# Patient Record
Sex: MALE | URBAN - METROPOLITAN AREA
[De-identification: names, ages, dates, MRNs, and addresses within clinical notes are randomized per-mention and may not be internally consistent; named-entity substitution may affect disease eponyms.]

---

## 2022-06-08 ENCOUNTER — APPOINTMENT (RX ONLY)
Dept: URBAN - METROPOLITAN AREA CLINIC 166 | Facility: CLINIC | Age: 62
Setting detail: DERMATOLOGY
End: 2022-06-08

## 2022-06-08 DIAGNOSIS — L56.8 OTHER SPECIFIED ACUTE SKIN CHANGES DUE TO ULTRAVIOLET RADIATION: ICD-10-CM | Status: WORSENING

## 2022-06-08 DIAGNOSIS — D22 MELANOCYTIC NEVI: ICD-10-CM

## 2022-06-08 DIAGNOSIS — L81.4 OTHER MELANIN HYPERPIGMENTATION: ICD-10-CM

## 2022-06-08 DIAGNOSIS — L82.1 OTHER SEBORRHEIC KERATOSIS: ICD-10-CM

## 2022-06-08 DIAGNOSIS — L57.0 ACTINIC KERATOSIS: ICD-10-CM

## 2022-06-08 DIAGNOSIS — L57.8 OTHER SKIN CHANGES DUE TO CHRONIC EXPOSURE TO NONIONIZING RADIATION: ICD-10-CM

## 2022-06-08 PROBLEM — D22.5 MELANOCYTIC NEVI OF TRUNK: Status: ACTIVE | Noted: 2022-06-08

## 2022-06-08 PROCEDURE — ? TREATMENT REGIMEN

## 2022-06-08 PROCEDURE — ? COUNSELING

## 2022-06-08 PROCEDURE — 17000 DESTRUCT PREMALG LESION: CPT

## 2022-06-08 PROCEDURE — 99204 OFFICE O/P NEW MOD 45 MIN: CPT | Mod: 25

## 2022-06-08 PROCEDURE — ? FULL BODY SKIN EXAM

## 2022-06-08 PROCEDURE — ? PRESCRIPTION

## 2022-06-08 PROCEDURE — ? LIQUID NITROGEN

## 2022-06-08 RX ORDER — PREDNISONE 20 MG/1
TABLET ORAL DAILY
Qty: 15 | Refills: 1 | Status: ERX | COMMUNITY
Start: 2022-06-08

## 2022-06-08 RX ORDER — BETAMETHASONE VALERATE 1 MG/G
OINTMENT TOPICAL
Qty: 45 | Refills: 2 | Status: ERX | COMMUNITY
Start: 2022-06-08

## 2022-06-08 RX ADMIN — BETAMETHASONE VALERATE: 1 OINTMENT TOPICAL at 00:00

## 2022-06-08 RX ADMIN — PREDNISONE: 20 TABLET ORAL at 00:00

## 2022-06-08 ASSESSMENT — LOCATION ZONE DERM
LOCATION ZONE: ARM
LOCATION ZONE: TRUNK
LOCATION ZONE: SCALP
LOCATION ZONE: FACE
LOCATION ZONE: NECK

## 2022-06-08 ASSESSMENT — LOCATION DETAILED DESCRIPTION DERM
LOCATION DETAILED: RIGHT PROXIMAL POSTERIOR UPPER ARM
LOCATION DETAILED: LEFT PROXIMAL ULNAR DORSAL FOREARM
LOCATION DETAILED: RIGHT PROXIMAL DORSAL FOREARM
LOCATION DETAILED: LEFT INFERIOR ANTERIOR NECK
LOCATION DETAILED: RIGHT CENTRAL FRONTAL SCALP
LOCATION DETAILED: LEFT INFERIOR CENTRAL MALAR CHEEK
LOCATION DETAILED: SUPERIOR THORACIC SPINE
LOCATION DETAILED: LEFT PROXIMAL POSTERIOR UPPER ARM
LOCATION DETAILED: RIGHT SUPERIOR MEDIAL UPPER BACK

## 2022-06-08 ASSESSMENT — LOCATION SIMPLE DESCRIPTION DERM
LOCATION SIMPLE: UPPER BACK
LOCATION SIMPLE: RIGHT UPPER BACK
LOCATION SIMPLE: RIGHT FOREARM
LOCATION SIMPLE: LEFT CHEEK
LOCATION SIMPLE: LEFT ANTERIOR NECK
LOCATION SIMPLE: LEFT FOREARM
LOCATION SIMPLE: RIGHT POSTERIOR UPPER ARM
LOCATION SIMPLE: RIGHT SCALP
LOCATION SIMPLE: LEFT POSTERIOR UPPER ARM

## 2022-06-08 ASSESSMENT — BSA RASH: BSA RASH: 14

## 2022-06-08 ASSESSMENT — PAIN INTENSITY VAS: HOW INTENSE IS YOUR PAIN 0 BEING NO PAIN, 10 BEING THE MOST SEVERE PAIN POSSIBLE?: NO PAIN

## 2022-06-08 ASSESSMENT — SEVERITY ASSESSMENT: SEVERITY: MILD TO MODERATE

## 2022-06-08 NOTE — PROCEDURE: LIQUID NITROGEN
Number Of Freeze-Thaw Cycles: 3 freeze-thaw cycles
Detail Level: Detailed
Show Aperture Variable?: Yes
Render Post-Care Instructions In Note?: no
Consent: The patient's consent was obtained including but not limited to risks of crusting, scabbing, blistering, scarring, darker or lighter pigmentary change, recurrence, incomplete removal and infection.
Post-Care Instructions: I reviewed with the patient in detail post-care instructions. Patient is to wear sunprotection, and avoid picking at any of the treated lesions. Pt may apply Vaseline to crusted or scabbing areas.
Duration Of Freeze Thaw-Cycle (Seconds): 3

## 2025-06-18 ENCOUNTER — APPOINTMENT (OUTPATIENT)
Dept: URBAN - METROPOLITAN AREA CLINIC 166 | Facility: CLINIC | Age: 65
Setting detail: DERMATOLOGY
End: 2025-06-18

## 2025-06-18 DIAGNOSIS — Z12.83 ENCOUNTER FOR SCREENING FOR MALIGNANT NEOPLASM OF SKIN: ICD-10-CM

## 2025-06-18 DIAGNOSIS — L82.1 OTHER SEBORRHEIC KERATOSIS: ICD-10-CM

## 2025-06-18 DIAGNOSIS — L57.0 ACTINIC KERATOSIS: ICD-10-CM

## 2025-06-18 DIAGNOSIS — L57.8 OTHER SKIN CHANGES DUE TO CHRONIC EXPOSURE TO NONIONIZING RADIATION: ICD-10-CM

## 2025-06-18 DIAGNOSIS — L81.4 OTHER MELANIN HYPERPIGMENTATION: ICD-10-CM

## 2025-06-18 DIAGNOSIS — D22 MELANOCYTIC NEVI: ICD-10-CM

## 2025-06-18 PROBLEM — D22.5 MELANOCYTIC NEVI OF TRUNK: Status: ACTIVE | Noted: 2025-06-18

## 2025-06-18 PROCEDURE — ? TREATMENT REGIMEN

## 2025-06-18 PROCEDURE — ? COUNSELING

## 2025-06-18 PROCEDURE — ? FULL BODY SKIN EXAM

## 2025-06-18 PROCEDURE — ? LIQUID NITROGEN

## 2025-06-18 ASSESSMENT — LOCATION DETAILED DESCRIPTION DERM
LOCATION DETAILED: RIGHT CENTRAL FRONTAL SCALP
LOCATION DETAILED: RIGHT CENTRAL PARIETAL SCALP
LOCATION DETAILED: RIGHT SUPERIOR MEDIAL UPPER BACK
LOCATION DETAILED: LEFT CENTRAL PARIETAL SCALP
LOCATION DETAILED: LEFT SUPERIOR MEDIAL UPPER BACK
LOCATION DETAILED: LEFT PROXIMAL POSTERIOR UPPER ARM
LOCATION DETAILED: LEFT INFERIOR CENTRAL MALAR CHEEK
LOCATION DETAILED: RIGHT PROXIMAL POSTERIOR UPPER ARM
LOCATION DETAILED: MID-FRONTAL SCALP
LOCATION DETAILED: RIGHT SUPERIOR PARIETAL SCALP
LOCATION DETAILED: RIGHT MEDIAL FRONTAL SCALP
LOCATION DETAILED: SUPERIOR THORACIC SPINE
LOCATION DETAILED: LEFT INFERIOR ANTERIOR NECK
LOCATION DETAILED: POSTERIOR MID-PARIETAL SCALP

## 2025-06-18 ASSESSMENT — LOCATION SIMPLE DESCRIPTION DERM
LOCATION SIMPLE: ANTERIOR SCALP
LOCATION SIMPLE: SCALP
LOCATION SIMPLE: POSTERIOR SCALP
LOCATION SIMPLE: LEFT POSTERIOR UPPER ARM
LOCATION SIMPLE: RIGHT POSTERIOR UPPER ARM
LOCATION SIMPLE: UPPER BACK
LOCATION SIMPLE: RIGHT UPPER BACK
LOCATION SIMPLE: LEFT UPPER BACK
LOCATION SIMPLE: LEFT CHEEK
LOCATION SIMPLE: RIGHT SCALP
LOCATION SIMPLE: LEFT ANTERIOR NECK

## 2025-06-18 ASSESSMENT — PAIN INTENSITY VAS: HOW INTENSE IS YOUR PAIN 0 BEING NO PAIN, 10 BEING THE MOST SEVERE PAIN POSSIBLE?: NO PAIN

## 2025-06-18 ASSESSMENT — TOTAL NUMBER OF LESIONS: # OF LESIONS?: 11

## 2025-06-18 NOTE — PROCEDURE: LIQUID NITROGEN
Consent: The patient's consent was obtained including but not limited to risks of crusting, scabbing, blistering, scarring, darker or lighter pigmentary change, recurrence, incomplete removal and infection.
Show Aperture Variable?: Yes
Render Note In Bullet Format When Appropriate: No
Post-Care Instructions: I reviewed with the patient in detail post-care instructions. Patient is to wear sunprotection, and avoid picking at any of the treated lesions. Pt may apply Vaseline to crusted or scabbing areas.
Duration Of Freeze Thaw-Cycle (Seconds): 3
Number Of Freeze-Thaw Cycles: 3 freeze-thaw cycles
Detail Level: Zone